# Patient Record
Sex: FEMALE | Race: ASIAN | NOT HISPANIC OR LATINO | URBAN - METROPOLITAN AREA
[De-identification: names, ages, dates, MRNs, and addresses within clinical notes are randomized per-mention and may not be internally consistent; named-entity substitution may affect disease eponyms.]

---

## 2024-09-23 ENCOUNTER — INPATIENT (INPATIENT)
Facility: HOSPITAL | Age: 64
LOS: 3 days | Discharge: HOME | End: 2024-09-27
Attending: SURGERY | Admitting: SURGERY
Payer: MEDICAID

## 2024-09-23 VITALS
DIASTOLIC BLOOD PRESSURE: 85 MMHG | HEART RATE: 86 BPM | OXYGEN SATURATION: 96 % | WEIGHT: 147.05 LBS | TEMPERATURE: 99 F | SYSTOLIC BLOOD PRESSURE: 132 MMHG | RESPIRATION RATE: 18 BRPM

## 2024-09-23 LAB
ADD ON TEST-SPECIMEN IN LAB: SIGNIFICANT CHANGE UP
ALBUMIN SERPL ELPH-MCNC: 3.9 G/DL — SIGNIFICANT CHANGE UP (ref 3.3–5)
ALBUMIN SERPL ELPH-MCNC: 4.4 G/DL — SIGNIFICANT CHANGE UP (ref 3.3–5)
ALP SERPL-CCNC: 114 U/L — SIGNIFICANT CHANGE UP (ref 40–120)
ALP SERPL-CCNC: 125 U/L — HIGH (ref 40–120)
ALT FLD-CCNC: 58 U/L — HIGH (ref 10–45)
ALT FLD-CCNC: 64 U/L — HIGH (ref 10–45)
ANION GAP SERPL CALC-SCNC: 11 MMOL/L — SIGNIFICANT CHANGE UP (ref 5–17)
ANION GAP SERPL CALC-SCNC: 9 MMOL/L — SIGNIFICANT CHANGE UP (ref 5–17)
AST SERPL-CCNC: 43 U/L — HIGH (ref 10–40)
AST SERPL-CCNC: 54 U/L — HIGH (ref 10–40)
BASOPHILS # BLD AUTO: 0.05 K/UL — SIGNIFICANT CHANGE UP (ref 0–0.2)
BASOPHILS NFR BLD AUTO: 0.7 % — SIGNIFICANT CHANGE UP (ref 0–2)
BILIRUB SERPL-MCNC: 0.6 MG/DL — SIGNIFICANT CHANGE UP (ref 0.2–1.2)
BILIRUB SERPL-MCNC: 0.7 MG/DL — SIGNIFICANT CHANGE UP (ref 0.2–1.2)
BUN SERPL-MCNC: 13 MG/DL — SIGNIFICANT CHANGE UP (ref 7–23)
BUN SERPL-MCNC: 14 MG/DL — SIGNIFICANT CHANGE UP (ref 7–23)
CALCIUM SERPL-MCNC: 9.2 MG/DL — SIGNIFICANT CHANGE UP (ref 8.4–10.5)
CALCIUM SERPL-MCNC: 9.6 MG/DL — SIGNIFICANT CHANGE UP (ref 8.4–10.5)
CHLORIDE SERPL-SCNC: 105 MMOL/L — SIGNIFICANT CHANGE UP (ref 96–108)
CHLORIDE SERPL-SCNC: 106 MMOL/L — SIGNIFICANT CHANGE UP (ref 96–108)
CO2 SERPL-SCNC: 25 MMOL/L — SIGNIFICANT CHANGE UP (ref 22–31)
CO2 SERPL-SCNC: 25 MMOL/L — SIGNIFICANT CHANGE UP (ref 22–31)
CREAT SERPL-MCNC: 0.8 MG/DL — SIGNIFICANT CHANGE UP (ref 0.5–1.3)
CREAT SERPL-MCNC: 0.9 MG/DL — SIGNIFICANT CHANGE UP (ref 0.5–1.3)
EGFR: 73 ML/MIN/1.73M2 — SIGNIFICANT CHANGE UP
EGFR: 84 ML/MIN/1.73M2 — SIGNIFICANT CHANGE UP
EOSINOPHIL # BLD AUTO: 0.34 K/UL — SIGNIFICANT CHANGE UP (ref 0–0.5)
EOSINOPHIL NFR BLD AUTO: 4.9 % — SIGNIFICANT CHANGE UP (ref 0–6)
GLUCOSE SERPL-MCNC: 102 MG/DL — HIGH (ref 70–99)
GLUCOSE SERPL-MCNC: 103 MG/DL — HIGH (ref 70–99)
HCT VFR BLD CALC: 36.1 % — SIGNIFICANT CHANGE UP (ref 34.5–45)
HGB BLD-MCNC: 11.3 G/DL — LOW (ref 11.5–15.5)
IMM GRANULOCYTES NFR BLD AUTO: 0.4 % — SIGNIFICANT CHANGE UP (ref 0–0.9)
LYMPHOCYTES # BLD AUTO: 2.64 K/UL — SIGNIFICANT CHANGE UP (ref 1–3.3)
LYMPHOCYTES # BLD AUTO: 37.9 % — SIGNIFICANT CHANGE UP (ref 13–44)
MCHC RBC-ENTMCNC: 28 PG — SIGNIFICANT CHANGE UP (ref 27–34)
MCHC RBC-ENTMCNC: 31.3 GM/DL — LOW (ref 32–36)
MCV RBC AUTO: 89.4 FL — SIGNIFICANT CHANGE UP (ref 80–100)
MONOCYTES # BLD AUTO: 0.4 K/UL — SIGNIFICANT CHANGE UP (ref 0–0.9)
MONOCYTES NFR BLD AUTO: 5.7 % — SIGNIFICANT CHANGE UP (ref 2–14)
NEUTROPHILS # BLD AUTO: 3.5 K/UL — SIGNIFICANT CHANGE UP (ref 1.8–7.4)
NEUTROPHILS NFR BLD AUTO: 50.4 % — SIGNIFICANT CHANGE UP (ref 43–77)
NRBC # BLD: 0 /100 WBCS — SIGNIFICANT CHANGE UP (ref 0–0)
PLATELET # BLD AUTO: 407 K/UL — HIGH (ref 150–400)
POTASSIUM SERPL-MCNC: 4.8 MMOL/L — SIGNIFICANT CHANGE UP (ref 3.5–5.3)
POTASSIUM SERPL-MCNC: SIGNIFICANT CHANGE UP MMOL/L (ref 3.5–5.3)
POTASSIUM SERPL-SCNC: 4.8 MMOL/L — SIGNIFICANT CHANGE UP (ref 3.5–5.3)
POTASSIUM SERPL-SCNC: SIGNIFICANT CHANGE UP MMOL/L (ref 3.5–5.3)
PROT SERPL-MCNC: 7.7 G/DL — SIGNIFICANT CHANGE UP (ref 6–8.3)
PROT SERPL-MCNC: 8.6 G/DL — HIGH (ref 6–8.3)
RBC # BLD: 4.04 M/UL — SIGNIFICANT CHANGE UP (ref 3.8–5.2)
RBC # FLD: 14.7 % — HIGH (ref 10.3–14.5)
SODIUM SERPL-SCNC: 140 MMOL/L — SIGNIFICANT CHANGE UP (ref 135–145)
SODIUM SERPL-SCNC: 141 MMOL/L — SIGNIFICANT CHANGE UP (ref 135–145)
WBC # BLD: 6.96 K/UL — SIGNIFICANT CHANGE UP (ref 3.8–10.5)
WBC # FLD AUTO: 6.96 K/UL — SIGNIFICANT CHANGE UP (ref 3.8–10.5)

## 2024-09-23 PROCEDURE — 86077 PHYS BLOOD BANK SERV XMATCH: CPT

## 2024-09-23 PROCEDURE — 99285 EMERGENCY DEPT VISIT HI MDM: CPT

## 2024-09-23 PROCEDURE — 76705 ECHO EXAM OF ABDOMEN: CPT | Mod: 26

## 2024-09-23 RX ORDER — HYDROMORPHONE HYDROCHLORIDE 1 MG/ML
0.5 INJECTION, SOLUTION INTRAMUSCULAR; INTRAVENOUS; SUBCUTANEOUS EVERY 4 HOURS
Refills: 0 | Status: DISCONTINUED | OUTPATIENT
Start: 2024-09-23 | End: 2024-09-26

## 2024-09-23 RX ORDER — SODIUM CHLORIDE IRRIG SOLUTION 0.9 %
1000 SOLUTION, IRRIGATION IRRIGATION
Refills: 0 | Status: DISCONTINUED | OUTPATIENT
Start: 2024-09-23 | End: 2024-09-26

## 2024-09-23 RX ORDER — FENOFIBRATE NANOCRYSTALLIZED 145 MG
1 TABLET ORAL
Refills: 0 | DISCHARGE

## 2024-09-23 RX ORDER — KETOROLAC TROMETHAMINE 10 MG/1
15 TABLET, FILM COATED ORAL ONCE
Refills: 0 | Status: DISCONTINUED | OUTPATIENT
Start: 2024-09-23 | End: 2024-09-23

## 2024-09-23 RX ORDER — PANTOPRAZOLE SODIUM 40 MG/1
40 TABLET, DELAYED RELEASE ORAL DAILY
Refills: 0 | Status: DISCONTINUED | OUTPATIENT
Start: 2024-09-23 | End: 2024-09-26

## 2024-09-23 RX ORDER — ONDANSETRON HCL/PF 4 MG/2 ML
4 VIAL (ML) INJECTION ONCE
Refills: 0 | Status: COMPLETED | OUTPATIENT
Start: 2024-09-23 | End: 2024-09-23

## 2024-09-23 RX ORDER — SODIUM CHLORIDE IRRIG SOLUTION 0.9 %
1000 SOLUTION, IRRIGATION IRRIGATION ONCE
Refills: 0 | Status: COMPLETED | OUTPATIENT
Start: 2024-09-23 | End: 2024-09-23

## 2024-09-23 RX ORDER — ACETAMINOPHEN 325 MG
1000 TABLET ORAL ONCE
Refills: 0 | Status: DISCONTINUED | OUTPATIENT
Start: 2024-09-23 | End: 2024-09-26

## 2024-09-23 RX ORDER — AMLODIPINE BESYLATE 5 MG
1 TABLET ORAL
Refills: 0 | DISCHARGE

## 2024-09-23 RX ORDER — ONDANSETRON HCL/PF 4 MG/2 ML
4 VIAL (ML) INJECTION EVERY 6 HOURS
Refills: 0 | Status: DISCONTINUED | OUTPATIENT
Start: 2024-09-23 | End: 2024-09-26

## 2024-09-23 RX ORDER — ENALAPRIL MALEATE 5 MG
1 TABLET ORAL
Refills: 0 | DISCHARGE

## 2024-09-23 RX ADMIN — Medication 1000 MILLILITER(S): at 19:19

## 2024-09-23 RX ADMIN — Medication 4 MILLIGRAM(S): at 21:34

## 2024-09-23 RX ADMIN — KETOROLAC TROMETHAMINE 15 MILLIGRAM(S): 10 TABLET, FILM COATED ORAL at 21:35

## 2024-09-23 RX ADMIN — Medication 1000 MILLILITER(S): at 21:30

## 2024-09-23 NOTE — ED PROVIDER NOTE - OBJECTIVE STATEMENT
63-year-old female with HTN, HLD, hypothyroidism, prior stomach ulcers on omeprazole, recent hospitalization abroad end of July for cholecystitis treated with antibiotics (no cholecystectomy per daughter because patient's gallbladder was too inflamed at the time).  Patient comes in today for persistent right upper quadrant pain radiating to the back associated with nausea, says pain is always there but is worsened at the end of eating.  Has been eating less due to the discomfort.  No fevers, chills.  Normal bowel movements.  Saw her PCP today who referred her to come to the ED to be evaluated by Dr. Escudero  Past surgical hx- C section

## 2024-09-23 NOTE — ED ADULT NURSE REASSESSMENT NOTE - NS ED NURSE REASSESS COMMENT FT1
Received patient report from JOSEFINA Torres. Patient currently in ultrasound upon RN handoff. Vital signs as noted in flowsheet. Patient came in due to abdominal pain with PMH of gallstones. Patient oriented to ED area. Plan of care discussed and verbalized understanding. All needs attended. Purposeful proactive hourly rounding in progress.

## 2024-09-23 NOTE — ED PROVIDER NOTE - CLINICAL SUMMARY MEDICAL DECISION MAKING FREE TEXT BOX
Normal VS, comfortable appearing not in acute distress  known gallstones and recent episode of cholecystitis treated w/ abx, here w/ RUQ pain and nausea  ddx- r/o recurrent cholecystitis vs symptomatic cholelithiasis vs choledocholithiasis vs pancreatitis    plan for labs, RUQ US, symptomatic treatment, surgery consult

## 2024-09-23 NOTE — ED PROVIDER NOTE - PHYSICAL EXAMINATION
CONST: nontoxic NAD speaking in full sentences  HEAD: atraumatic  EYES: conjunctivae clear  NECK: supple  CARD: regular rate  CHEST: breathing comfortably, no stridor/retractions/tripoding  ABD: soft nondistended negative murphys sign  EXT: FROM  SKIN: warm, dry  NEURO: awake alert answering questions following commands moving all extremities

## 2024-09-23 NOTE — ED ADULT NURSE NOTE - CHIEF COMPLAINT QUOTE
pt presents to ER with daughter, daughter states that she had RUQ abdominal pain while in [place] in July, had an ultrasound that showed gallstones. as per daughter, gallstones were so inflamed that they couldn't operate so she was put on antibiotics. pt states the pain went away for a few weeks and has returned a few days ago. denies n/v/d or fevers.

## 2024-09-23 NOTE — ED ADULT NURSE NOTE - OBJECTIVE STATEMENT
Pt is a 64 y/o female A&Ox4 in NAD ambulatory with steady gait c/o RUQ abdominal pain. Pt diagnosed with gallstones in July and treated with antibiotics. Pt denies N/V/D, fever/chills. RR even and unlabored.

## 2024-09-23 NOTE — H&P ADULT - ASSESSMENT
63F PMHx acute cholecystitis (tx w/ abx), gastric ulcers admitted for likely biliary colic vs possible early acute cholecystitis. Pt w/ prior hx of acute cholecystitis episode x2 months ago, tx w/ abx. Pt currently afebrile, CBC WNL, no RUQ TTP on PE, negative murphys, US demonstrates cholelithiasis w/o pericholecystic fluid, no wall thickening. Plan to admit to regional for cholecystectomy this week.    Admit - [facility] - [facility]  NPO/IVF  Pain/Nausea PRN  OOBA/IS/SCD/SQH  AM/Pre-op labs  Home meds as appropriate    Home

## 2024-09-23 NOTE — H&P ADULT - HISTORY OF PRESENT ILLNESS
64yo nasreen speaking Female pt with PMH HTN, HLD, gastric ulcers presents reporting intermittent, sharp, 6/10 RUQ pain w/ radiation to RT side/back w/ some associated nausea, episodes last 4-6 hours, unrelated to PO intake (reports some resolution w/ eating). Pt reported to PCP today who referred to ED for evaluation by Dr. Escudero. Pt reports prior hospitalization for acute cholecystitis x2 months ago while abroad, tx w/ abx.    In the ED, pt afebrile, nontachycardic, normotensive, and satting on RA. Labs demonstrate WBC 4, Hgb 11, AST/ALT 43/58, lipase 45, tbili 0.6. RUQ US demonstrates cholelithiasis w/o pericholecystic fluid, no wall thickening, RT lobe liver lesion, likely hemangioma.     PMH: HTN, HLD, hypothyroidism, gastric ulcers, cholecystitis   PSHx:   Medications: Omeprazole, Fenofibrate 160 qd, Enalapril 5 mg qd, Amlodipine 10 mg qd  Allergies: Shellfish  Social Hx: Denies tobacco, alcohol, rec drug use  Last colonoscopy: NA  Last EGD: 3-4 months ago, found to have gastric ulcers     T(C): 36.9 (24 @ 21:28), Max: 37 (24 @ 18:25)  HR: 67 (24 @ 21:28) (67 - 86)  BP: 115/77 (24 @ 21:28) (115/77 - 132/85)  RR: 19 (24 @ 21:28) (18 - 19)  SpO2: 95% (24 @ 21:28) (95% - 96%)        LABS:                        11.3   6.96  )-----------( 407      ( 23 Sep 2024 19:04 )             36.1     -    140  |  106  |  14  ----------------------------<  103[H]  4.8   |  25  |  0.80    Ca    9.2      23 Sep 2024 21:13    TPro  7.7  /  Alb  3.9  /  TBili  0.6  /  DBili  x   /  AST  43[H]  /  ALT  58[H]  /  AlkPhos  114  09

## 2024-09-23 NOTE — H&P ADULT - NSHPPHYSICALEXAM_GEN_ALL_CORE
Physical Exam  General: AAOx3, NAD, laying comfortably in bed  Cardio: S1,S2, No MRG  Pulm: Nonlabored breathing  Abdomen: Soft, NT, negative Jimenez's, nondistended, no rebound/guarding   Extremities: WWP, peripheral pulses appreciated

## 2024-09-24 LAB
ADD ON TEST-SPECIMEN IN LAB: SIGNIFICANT CHANGE UP
ALBUMIN SERPL ELPH-MCNC: 4 G/DL — SIGNIFICANT CHANGE UP (ref 3.3–5)
ALP SERPL-CCNC: 107 U/L — SIGNIFICANT CHANGE UP (ref 40–120)
ALT FLD-CCNC: 55 U/L — HIGH (ref 10–45)
ANION GAP SERPL CALC-SCNC: 9 MMOL/L — SIGNIFICANT CHANGE UP (ref 5–17)
APTT BLD: 44.8 SEC — HIGH (ref 24.5–35.6)
AST SERPL-CCNC: 41 U/L — HIGH (ref 10–40)
BILIRUB DIRECT SERPL-MCNC: 0.3 MG/DL — SIGNIFICANT CHANGE UP (ref 0–0.3)
BILIRUB INDIRECT FLD-MCNC: 0.3 MG/DL — SIGNIFICANT CHANGE UP (ref 0.2–1)
BILIRUB SERPL-MCNC: 0.6 MG/DL — SIGNIFICANT CHANGE UP (ref 0.2–1.2)
BLD GP AB SCN SERPL QL: POSITIVE — SIGNIFICANT CHANGE UP
BLD GP AB SCN SERPL QL: POSITIVE — SIGNIFICANT CHANGE UP
BUN SERPL-MCNC: 13 MG/DL — SIGNIFICANT CHANGE UP (ref 7–23)
CALCIUM SERPL-MCNC: 9.4 MG/DL — SIGNIFICANT CHANGE UP (ref 8.4–10.5)
CHLORIDE SERPL-SCNC: 105 MMOL/L — SIGNIFICANT CHANGE UP (ref 96–108)
CO2 SERPL-SCNC: 28 MMOL/L — SIGNIFICANT CHANGE UP (ref 22–31)
CREAT SERPL-MCNC: 0.93 MG/DL — SIGNIFICANT CHANGE UP (ref 0.5–1.3)
EGFR: 70 ML/MIN/1.73M2 — SIGNIFICANT CHANGE UP
GLUCOSE SERPL-MCNC: 97 MG/DL — SIGNIFICANT CHANGE UP (ref 70–99)
HCT VFR BLD CALC: 34.3 % — LOW (ref 34.5–45)
HGB BLD-MCNC: 10.9 G/DL — LOW (ref 11.5–15.5)
INR BLD: 0.96 — SIGNIFICANT CHANGE UP (ref 0.85–1.18)
MAGNESIUM SERPL-MCNC: 2.1 MG/DL — SIGNIFICANT CHANGE UP (ref 1.6–2.6)
MCHC RBC-ENTMCNC: 28.8 PG — SIGNIFICANT CHANGE UP (ref 27–34)
MCHC RBC-ENTMCNC: 31.8 GM/DL — LOW (ref 32–36)
MCV RBC AUTO: 90.5 FL — SIGNIFICANT CHANGE UP (ref 80–100)
NRBC # BLD: 0 /100 WBCS — SIGNIFICANT CHANGE UP (ref 0–0)
PHOSPHATE SERPL-MCNC: 4.1 MG/DL — SIGNIFICANT CHANGE UP (ref 2.5–4.5)
PLATELET # BLD AUTO: 368 K/UL — SIGNIFICANT CHANGE UP (ref 150–400)
POTASSIUM SERPL-MCNC: 4.4 MMOL/L — SIGNIFICANT CHANGE UP (ref 3.5–5.3)
POTASSIUM SERPL-SCNC: 4.4 MMOL/L — SIGNIFICANT CHANGE UP (ref 3.5–5.3)
PROT SERPL-MCNC: 7.5 G/DL — SIGNIFICANT CHANGE UP (ref 6–8.3)
PROTHROM AB SERPL-ACNC: 11 SEC — SIGNIFICANT CHANGE UP (ref 9.5–13)
RBC # BLD: 3.79 M/UL — LOW (ref 3.8–5.2)
RBC # FLD: 14.7 % — HIGH (ref 10.3–14.5)
RH IG SCN BLD-IMP: POSITIVE — SIGNIFICANT CHANGE UP
SODIUM SERPL-SCNC: 142 MMOL/L — SIGNIFICANT CHANGE UP (ref 135–145)
WBC # BLD: 6.26 K/UL — SIGNIFICANT CHANGE UP (ref 3.8–10.5)
WBC # FLD AUTO: 6.26 K/UL — SIGNIFICANT CHANGE UP (ref 3.8–10.5)

## 2024-09-24 PROCEDURE — 99223 1ST HOSP IP/OBS HIGH 75: CPT

## 2024-09-24 RX ADMIN — Medication 5000 UNIT(S): at 19:13

## 2024-09-24 RX ADMIN — Medication 100 MILLILITER(S): at 01:24

## 2024-09-24 RX ADMIN — Medication 5000 UNIT(S): at 05:22

## 2024-09-24 RX ADMIN — PANTOPRAZOLE SODIUM 40 MILLIGRAM(S): 40 TABLET, DELAYED RELEASE ORAL at 12:57

## 2024-09-24 NOTE — PATIENT PROFILE ADULT - MST SCORE
----- Message from MINA Griffin sent at 5/13/2021  8:28 AM CDT -----  Please notify patient that urine culture did grow some bacteria-how she feeling? If symptomatic-would send prescription for Macrobid- please ask which pharmacy.
Patient informed of the below results and recommendations. Patient states she does continue to have symptoms; burning at the end of urinating but not as bad. Patient would like prescription faxed to [facility], [facility]. Please advise.
0

## 2024-09-24 NOTE — PROGRESS NOTE ADULT - SUBJECTIVE AND OBJECTIVE BOX
SUBJECTIVE:   Pt seen and examined by chief resident. Pt is doing well, resting comfortably on bed. Feeling better. Pain controlled. Passing gas. No nausea or vomiting. No complaints at this time.    Vital Signs Last 24 Hrs  T(C): 36.7 (24 Sep 2024 05:22), Max: 37 (23 Sep 2024 18:25)  T(F): 98 (24 Sep 2024 05:22), Max: 98.6 (23 Sep 2024 18:25)  HR: 59 (24 Sep 2024 05:22) (56 - 86)  BP: 131/76 (24 Sep 2024 05:22) (115/77 - 134/78)  BP(mean): --  RR: 16 (24 Sep 2024 05:22) (16 - 19)  SpO2: 96% (24 Sep 2024 05:22) (95% - 100%)    Parameters below as of 23 Sep 2024 23:20  Patient On (Oxygen Delivery Method): room air        I&O's Summary    23 Sep 2024 07:01  -  24 Sep 2024 07:00  --------------------------------------------------------  IN: 0 mL / OUT: 425 mL / NET: -425 mL        Physical Exam:  General Appearance: Appears well, NAD  Pulmonary: Nonlabored breathing, no respiratory distress  Abdomen: Soft, nondisteded, nontender  Extremities: WWP, SCD's in place     LABS:                        10.9   6.26  )-----------( 368      ( 24 Sep 2024 05:30 )             34.3     09-24    142  |  105  |  13  ----------------------------<  97  4.4   |  28  |  0.93    Ca    9.4      24 Sep 2024 05:30  Phos  4.1     09-24  Mg     2.1     09-24    TPro  7.7  /  Alb  3.9  /  TBili  0.6  /  DBili  x   /  AST  43[H]  /  ALT  58[H]  /  AlkPhos  114  09-23    PT/INR - ( 24 Sep 2024 05:30 )   PT: 11.0 sec;   INR: 0.96          PTT - ( 24 Sep 2024 05:30 )  PTT:44.8 sec  Urinalysis Basic - ( 24 Sep 2024 05:30 )    Color: x / Appearance: x / SG: x / pH: x  Gluc: 97 mg/dL / Ketone: x  / Bili: x / Urobili: x   Blood: x / Protein: x / Nitrite: x   Leuk Esterase: x / RBC: x / WBC x   Sq Epi: x / Non Sq Epi: x / Bacteria: x

## 2024-09-24 NOTE — PATIENT PROFILE ADULT - FALL HARM RISK - UNIVERSAL INTERVENTIONS
Bed in lowest position, wheels locked, appropriate side rails in place/Call bell, personal items and telephone in reach/Instruct patient to call for assistance before getting out of bed or chair/Non-slip footwear when patient is out of bed/[place] to call system/Physically safe environment - no spills, clutter or unnecessary equipment/Purposeful Proactive Rounding/Room/bathroom lighting operational, light cord in reach

## 2024-09-24 NOTE — CONSULT NOTE ADULT - SUBJECTIVE AND OBJECTIVE BOX
HPI:  64yo nasreen speaking Female pt with PMH HTN, HLD, gastric ulcers presents reporting intermittent, sharp, 6/10 RUQ pain w/ radiation to RT side/back w/ some associated nausea, episodes last 4-6 hours, unrelated to PO intake (reports some resolution w/ eating). Pt reported to PCP today who referred to ED for evaluation by Dr. Escudero. Pt reports prior hospitalization for acute cholecystitis x2 months ago while abroad, tx w/ abx.    In the ED, pt afebrile, nontachycardic, normotensive, and satting on RA. Labs demonstrate WBC 4, Hgb 11, AST/ALT 43/58, lipase 45, tbili 0.6. RUQ US demonstrates cholelithiasis w/o pericholecystic fluid, no wall thickening, RT lobe liver lesion, likely hemangioma.     PMH: HTN, HLD, hypothyroidism, gastric ulcers, cholecystitis   PSHx:   Medications: Omeprazole, Fenofibrate 160 qd, Enalapril 5 mg qd, Amlodipine 10 mg qd  Allergies: Shellfish  Social Hx: Denies tobacco, alcohol, rec drug use  Last colonoscopy: NA  Last EGD: 3-4 months ago, found to have gastric ulcers     T(C): 36.9 (24 @ 21:28), Max: 37 (24 @ 18:25)  HR: 67 (24 @ 21:28) (67 - 86)  BP: 115/77 (24 @ 21:28) (115/77 - 132/85)  RR: 19 (24 @ 21:28) (18 - 19)  SpO2: 95% (24 @ 21:28) (95% - 96%)        LABS:                        11.3   6.96  )-----------( 407      ( 23 Sep 2024 19:04 )             36.1         140  |  106  |  14  ----------------------------<  103[H]  4.8   |  25  |  0.80    Ca    9.2      23 Sep 2024 21:13    TPro  7.7  /  Alb  3.9  /  TBili  0.6  /  DBili  x   /  AST  43[H]  /  ALT  58[H]  /  AlkPhos  114  09-   (23 Sep 2024 23:30)      PAST MEDICAL & SURGICAL HISTORY:      Home Medications:  amLODIPine 10 mg oral tablet: 1 tab(s) orally once a day (23 Sep 2024 23:39)  enalapril 5 mg oral tablet: 1 tab(s) orally once a day (23 Sep 2024 23:39)  fenofibrate 160 mg oral tablet: 1 tab(s) orally once a day (23 Sep 2024 23:39)  omeprazole 40 mg oral delayed release capsule: 1 cap(s) orally once a day (23 Sep 2024 23:39)      Allergies    No Known Drug Allergies  shellfish (Swelling)    Intolerances        FAMILY HISTORY:    CURRENT MEDICATIONS:   acetaminophen   IVPB .. 1000 milliGRAM(s) IV Intermittent once PRN  HYDROmorphone  Injectable 0.5 milliGRAM(s) IV Push every 4 hours PRN  lactated ringers. 1000 milliLiter(s) IV Continuous <Continuous>  ondansetron Injectable 4 milliGRAM(s) IV Push every 6 hours PRN  pantoprazole  Injectable 40 milliGRAM(s) IV Push daily      VITAL SIGNS, INS/OUTS (last 24 hours):  Vital Signs Last 24 Hrs  T(C): 36.8 (24 Sep 2024 13:37), Max: 37 (23 Sep 2024 18:25)  T(F): 98.2 (24 Sep 2024 13:37), Max: 98.6 (23 Sep 2024 18:25)  HR: 65 (24 Sep 2024 13:37) (53 - 86)  BP: 125/76 (24 Sep 2024 13:37) (115/77 - 134/78)  BP(mean): --  RR: 18 (24 Sep 2024 13:37) (16 - 19)  SpO2: 94% (24 Sep 2024 13:37) (94% - 100%)    Parameters below as of 24 Sep 2024 13:37  Patient On (Oxygen Delivery Method): room air      I&O's Summary    23 Sep 2024 07:01  -  24 Sep 2024 07:00  --------------------------------------------------------  IN: 0 mL / OUT: 425 mL / NET: -425 mL    24 Sep 2024 07:01  -  24 Sep 2024 13:48  --------------------------------------------------------  IN: 0 mL / OUT: 400 mL / NET: -400 mL        PHYSICAL EXAM:  Gen: Reclining in bed at time of exam, appears stated age  HEENT: NCAT, MMM, clear OP  Neck: supple, trachea at midline  CV: RRR, +S1/S2  Pulm: adequate respiratory effort, no increase in work of breathing  Abd: soft, NTND  Skin: warm and dry, no new rashes vs prior report  Ext: WWP, no LE edema  Neuro: AOx3, no gross focal neurological deficits  Psych: affect and behavior appropriate, pleasant at time of interview    BASIC LABS:                        10.9   6.26  )-----------( 368      ( 24 Sep 2024 05:30 )             34.3         142  |  105  |  13  ----------------------------<  97  4.4   |  28  |  0.93    Ca    9.4      24 Sep 2024 05:30  Phos  4.1       Mg     2.1         TPro  7.5  /  Alb  4.0  /  TBili  0.6  /  DBili  0.3  /  AST  41[H]  /  ALT  55[H]  /  AlkPhos  107  24    PT/INR - ( 24 Sep 2024 05:30 )   PT: 11.0 sec;   INR: 0.96          PTT - ( 24 Sep 2024 05:30 )  PTT:44.8 sec  Urinalysis Basic - ( 24 Sep 2024 05:30 )    Color: x / Appearance: x / SG: x / pH: x  Gluc: 97 mg/dL / Ketone: x  / Bili: x / Urobili: x   Blood: x / Protein: x / Nitrite: x   Leuk Esterase: x / RBC: x / WBC x   Sq Epi: x / Non Sq Epi: x / Bacteria: x      CAPILLARY BLOOD GLUCOSE          OTHER LABS:        MICRODATA:      IMAGING:    EKG:    #Diet - Diet, NPO (24 @ 01:56) [Active]        #DVT PPx -  #Dispo -

## 2024-09-24 NOTE — CONSULT NOTE ADULT - ASSESSMENT
63F PMHx acute cholecystitis (tx w/ abx), gastric ulcers, HTN, HLD admitted for likely biliary colic vs possible early acute cholecystitis. Admitted for cholecystitis     #Cholecystitis   Plan for OR today   NPO on IVF   Pain management with tylenol and dilaudid   Management as per surgery team     #Hx of gastric ulcer   On omeprazole   Continue pantoprazole while admitted     #HTN   #HLD  On Fenofibrate 160 qd, Enalapril 5 mg qd, Amlodipine 10 mg qd at home   Resume home meds when able to take PO (with hold parameters)    SCD boot for DVT ppx

## 2024-09-25 LAB
ALBUMIN SERPL ELPH-MCNC: 4.1 G/DL — SIGNIFICANT CHANGE UP (ref 3.3–5)
ALP SERPL-CCNC: 112 U/L — SIGNIFICANT CHANGE UP (ref 40–120)
ALT FLD-CCNC: 54 U/L — HIGH (ref 10–45)
ANION GAP SERPL CALC-SCNC: 10 MMOL/L — SIGNIFICANT CHANGE UP (ref 5–17)
AST SERPL-CCNC: 43 U/L — HIGH (ref 10–40)
BILIRUB SERPL-MCNC: 0.7 MG/DL — SIGNIFICANT CHANGE UP (ref 0.2–1.2)
BUN SERPL-MCNC: 11 MG/DL — SIGNIFICANT CHANGE UP (ref 7–23)
CALCIUM SERPL-MCNC: 9.5 MG/DL — SIGNIFICANT CHANGE UP (ref 8.4–10.5)
CHLORIDE SERPL-SCNC: 103 MMOL/L — SIGNIFICANT CHANGE UP (ref 96–108)
CO2 SERPL-SCNC: 26 MMOL/L — SIGNIFICANT CHANGE UP (ref 22–31)
CREAT SERPL-MCNC: 0.85 MG/DL — SIGNIFICANT CHANGE UP (ref 0.5–1.3)
EGFR: 78 ML/MIN/1.73M2 — SIGNIFICANT CHANGE UP
GLUCOSE SERPL-MCNC: 90 MG/DL — SIGNIFICANT CHANGE UP (ref 70–99)
HCT VFR BLD CALC: 33.8 % — LOW (ref 34.5–45)
HGB BLD-MCNC: 10.9 G/DL — LOW (ref 11.5–15.5)
MAGNESIUM SERPL-MCNC: 2.1 MG/DL — SIGNIFICANT CHANGE UP (ref 1.6–2.6)
MCHC RBC-ENTMCNC: 28.2 PG — SIGNIFICANT CHANGE UP (ref 27–34)
MCHC RBC-ENTMCNC: 32.2 GM/DL — SIGNIFICANT CHANGE UP (ref 32–36)
MCV RBC AUTO: 87.6 FL — SIGNIFICANT CHANGE UP (ref 80–100)
NRBC # BLD: 0 /100 WBCS — SIGNIFICANT CHANGE UP (ref 0–0)
PHOSPHATE SERPL-MCNC: 3.8 MG/DL — SIGNIFICANT CHANGE UP (ref 2.5–4.5)
PLATELET # BLD AUTO: 381 K/UL — SIGNIFICANT CHANGE UP (ref 150–400)
POTASSIUM SERPL-MCNC: 4 MMOL/L — SIGNIFICANT CHANGE UP (ref 3.5–5.3)
POTASSIUM SERPL-SCNC: 4 MMOL/L — SIGNIFICANT CHANGE UP (ref 3.5–5.3)
PROT SERPL-MCNC: 8 G/DL — SIGNIFICANT CHANGE UP (ref 6–8.3)
RBC # BLD: 3.86 M/UL — SIGNIFICANT CHANGE UP (ref 3.8–5.2)
RBC # FLD: 14 % — SIGNIFICANT CHANGE UP (ref 10.3–14.5)
SODIUM SERPL-SCNC: 139 MMOL/L — SIGNIFICANT CHANGE UP (ref 135–145)
WBC # BLD: 5.44 K/UL — SIGNIFICANT CHANGE UP (ref 3.8–10.5)
WBC # FLD AUTO: 5.44 K/UL — SIGNIFICANT CHANGE UP (ref 3.8–10.5)

## 2024-09-25 PROCEDURE — 99231 SBSQ HOSP IP/OBS SF/LOW 25: CPT

## 2024-09-25 PROCEDURE — 99232 SBSQ HOSP IP/OBS MODERATE 35: CPT

## 2024-09-25 RX ADMIN — Medication 5000 UNIT(S): at 04:39

## 2024-09-25 RX ADMIN — Medication 100 MILLILITER(S): at 08:36

## 2024-09-25 RX ADMIN — Medication 5000 UNIT(S): at 19:37

## 2024-09-25 RX ADMIN — PANTOPRAZOLE SODIUM 40 MILLIGRAM(S): 40 TABLET, DELAYED RELEASE ORAL at 12:24

## 2024-09-25 RX ADMIN — Medication 100 MILLILITER(S): at 17:50

## 2024-09-25 NOTE — DISCHARGE NOTE PROVIDER - CARE PROVIDER_API CALL
Armando Escudero [facility]  Surgery  [address] [place] [id number]  Phone: [phone]  Fax: [phone]  Follow Up Time:

## 2024-09-25 NOTE — DISCHARGE NOTE PROVIDER - NSDCFUADDINST_GEN_ALL_CORE_FT
General Discharge Instructions:  Please resume all regular home medications unless specifically advised not to take a particular medication. Also, please take any new medications as prescribed.  Please get plenty of rest, continue to ambulate several times per day, and drink adequate amounts of fluids. Avoid lifting weights greater than 5-10 lbs until you follow-up with your surgeon, who will instruct you further regarding activity restrictions.  Avoid driving or operating heavy machinery while taking pain medications.  Please follow-up with your surgeon and Primary Care Provider (PCP) as advised.  Please follow up with Dr. Escudero in 1-2 weeks by calling his office at [phone]    Medications:   -Please take over the counter Tylenol (650mg-1000mg) every 6 hours for mild-moderate pain control. Do not exceed 4000mg of Tylenol daily.   -Please take Oxycodone 5mg every 6-8 hours as per needed for severe pain.  -Please take Colace to prevent constipation from Oxycodone. Please take if you are taking Oxycodone.      Incision Care:  *Please call your doctor or nurse practitioner if you have increased pain, swelling, redness, or drainage from the incision site.  *Avoid swimming and baths until your follow-up appointment.  *You may shower, and wash surgical incisions with a mild soap and warm water. Gently pat the area dry.    Warning Signs:  Please call your doctor or nurse practitioner if you experience the following:  *You experience new chest pain, pressure, squeezing or tightness.  *New or worsening cough, shortness of breath, or wheeze.  *If you are vomiting and cannot keep down fluids or your medications.  *You are getting dehydrated due to continued vomiting, diarrhea, or other reasons. Signs of dehydration include dry mouth, rapid heartbeat, or feeling dizzy or faint when standing.  *You see blood or dark/black material when you vomit or have a bowel movement.  *You experience burning when you urinate, have blood in your urine, or experience a discharge.  *Your pain is not improving within 8-12 hours or is not gone within 24 hours. Call or return immediately if your pain is getting worse, changes location, or moves to your chest or back.  *You have shaking chills, or fever greater than 101.5 degrees Fahrenheit or 38 degrees Celsius.  *Any change in your symptoms, or any new symptoms that concern you.  General Discharge Instructions:  Please resume all regular home medications unless specifically advised not to take a particular medication. Also, please take any new medications as prescribed.  Please get plenty of rest, continue to ambulate several times per day, and drink adequate amounts of fluids. Avoid lifting weights greater than 5-10 lbs until you follow-up with your surgeon, who will instruct you further regarding activity restrictions.  Avoid driving or operating heavy machinery while taking pain medications.  Please follow-up with your surgeon and Primary Care Provider (PCP) as advised.  Please follow up with Dr. Kena leach by calling his office at [phone]    Medications:   -Please take over the counter Tylenol (650mg-1000mg) every 6 hours for mild-moderate pain control. Do not exceed 4000mg of Tylenol daily.   -Please take Oxycodone 5mg every 6-8 hours as per needed for severe pain.  -Please take Colace to prevent constipation from Oxycodone. Please take if you are taking Oxycodone.      Incision Care:  *Please call your doctor or nurse practitioner if you have increased pain, swelling, redness, or drainage from the incision site.  *Avoid swimming and baths until your follow-up appointment.  *You may shower, and wash surgical incisions with a mild soap and warm water. Gently pat the area dry.    Warning Signs:  Please call your doctor or nurse practitioner if you experience the following:  *You experience new chest pain, pressure, squeezing or tightness.  *New or worsening cough, shortness of breath, or wheeze.  *If you are vomiting and cannot keep down fluids or your medications.  *You are getting dehydrated due to continued vomiting, diarrhea, or other reasons. Signs of dehydration include dry mouth, rapid heartbeat, or feeling dizzy or faint when standing.  *You see blood or dark/black material when you vomit or have a bowel movement.  *You experience burning when you urinate, have blood in your urine, or experience a discharge.  *Your pain is not improving within 8-12 hours or is not gone within 24 hours. Call or return immediately if your pain is getting worse, changes location, or moves to your chest or back.  *You have shaking chills, or fever greater than 101.5 degrees Fahrenheit or 38 degrees Celsius.  *Any change in your symptoms, or any new symptoms that concern you.

## 2024-09-25 NOTE — DISCHARGE NOTE PROVIDER - NSDCMRMEDTOKEN_GEN_ALL_CORE_FT
amLODIPine 10 mg oral tablet: 1 tab(s) orally once a day  enalapril 5 mg oral tablet: 1 tab(s) orally once a day  fenofibrate 160 mg oral tablet: 1 tab(s) orally once a day  omeprazole 40 mg oral delayed release capsule: 1 cap(s) orally once a day   amLODIPine 10 mg oral tablet: 1 tab(s) orally once a day  Colace 100 mg oral capsule: 1 cap(s) orally every 12 hours as needed for  constipation  enalapril 5 mg oral tablet: 1 tab(s) orally once a day  fenofibrate 160 mg oral tablet: 1 tab(s) orally once a day  omeprazole 40 mg oral delayed release capsule: 1 cap(s) orally once a day  oxyCODONE 5 mg oral tablet: 1 tab(s) orally every 6 hours as needed for  severe pain MDD: 4

## 2024-09-25 NOTE — DISCHARGE NOTE PROVIDER - HOSPITAL COURSE
63F PMHx acute cholecystitis (tx w/ abx), gastric ulcers admitted for likely biliary colic vs symptomatic cholelithiasis, vs possible early acute cholecystitis. Pt w/ prior hx of acute cholecystitis episode x2 months ago, tx w/ abx. Pt currently afebrile, CBC WNL, no RUQ TTP on PE, negative murphys, US demonstrates cholelithiasis w/o pericholecystic fluid, no wall thickening.       55 year old female with known history of gallstones, presented to the ED with epigastric and RUQ postprandial pain x 1 day and has been persistent since then. Associated symptoms include nausea and over 10 episodes of bilious emesis and PO intolerance. Patient reports she had an episode of similar symptoms earlier in March of 2024, at that time she received symptomatic treatment and was scheduled for lap cholecystectomy on June 1st at [facility]. Patient presented with tenderness in epigastrium and RUQ, negative Jimenez sign. Labs were significant for WBC 11.73, Nicola 92.2%, BUN 8, Cr 0.45, Bili 0.4, Alk Phos 112. Imaging significant of right upper quadrant ultrasound showing CBD 4 mm, 9 mm gallstones, increased echogenicity of pericholecystic fat. Patient was admitted for surgical treatment of acute cholecystitis and is now s/p RA cholecystectomy (5/20). Patient's post-operative course was uncomplicated. Diet was advanced to low fat diet as tolerated without nausea or vomiting and post-op pain was well controlled on oral medication. Patient is ambulating without difficulty and voiding spontaneously with bowel function. This patient is deemed ready for discharge and will follow up with the surgeon in the outpatient setting. 64 y/o Yakut speaking Female patient PMHx of HTN, HLD, gastric ulcers admitted for 6/10 RUQ pain       for likely biliary colic vs symptomatic cholelithiasis, vs possible early acute cholecystitis. Pt w/ prior hx of acute cholecystitis episode x2 months ago, tx w/ abx. Pt currently afebrile, CBC WNL, no RUQ TTP on PE, negative murphys, US demonstrates cholelithiasis w/o pericholecystic fluid, no wall thickening.   62yo nasreen speaking Female pt with PMH HTN, HLD, gastric ulcers presents reporting intermittent, sharp, 6/10 RUQ pain w/ radiation to RT side/back w/ some associated nausea, episodes last 4-6 hours, unrelated to PO intake (reports some resolution w/ eating). Pt reported to PCP today who referred to ED for evaluation by Dr. Escudero. Pt reports prior hospitalization [place]stit[place] tx w/ [place]x.      55 year old female with known history of gallstones, presented to the ED with epigastric and RUQ postprandial pain x 1 day and has been persistent since then. Associated symptoms include nausea and over 10 episodes of bilious emesis and PO intolerance. Patient reports she had an episode of similar symptoms earlier in March of 2024, at that time she received symptomatic treatment and was scheduled for lap cholecystectomy on June 1st at [facility]. Patient presented with tenderness in epigastrium and RUQ, negative Jimenez sign. Labs were significant for WBC 11.73, Nicola 92.2%, BUN 8, Cr 0.45, Bili 0.4, Alk Phos 112. Imaging significant of right upper quadrant ultrasound showing CBD 4 mm, 9 mm gallstones, increased echogenicity of pericholecystic fat. Patient was admitted for surgical treatment of acute cholecystitis and is now s/p RA cholecystectomy (5/20). Patient's post-operative course was uncomplicated. Diet was advanced to low fat diet as tolerated without nausea or vomiting and post-op pain was well controlled on oral medication. Patient is ambulating without difficulty and voiding spontaneously with bowel function. This patient is deemed ready for discharge and will follow up with the surgeon in the outpatient setting. 62 y/o Romanian speaking Female patient PMHx of HTN, HLD, gastric ulcers admitted for 6/10 RUQ pain radiating to right side associated with nausea and poor PO intake. Patient was referred to ED by PCP as she reports       for likely biliary colic vs symptomatic cholelithiasis, vs possible early acute cholecystitis. Pt w/ prior hx of acute cholecystitis episode x2 months ago, tx w/ abx. Pt currently afebrile, CBC WNL, no RUQ TTP on PE, negative murphys, US demonstrates cholelithiasis w/o pericholecystic fluid, no wall thickening.   64yo Bulgarian speaking Female pt with PMH HTN, HLD, gastric ulcers presents reporting intermittent, sharp, 6/10 RUQ pain w/ radiation to RT side/back w/ some associated nausea, episodes last 4-6 hours, unrelated to PO intake (reports some resolution w/ eating). Pt reported to PCP today who referred to ED for evaluation by Dr. Escudero. Pt reports prior hospitalization for acute ch[place]cystitis [place], tx w/ abx.      55 year old female with known history of gallstones, presented to the ED with epigastric and RUQ postprandial pain x 1 day and has been persistent since then. Associated symptoms include nausea and over 10 episodes of bilious emesis and PO intolerance. Patient reports she had an episode of similar symptoms earlier in March of 2024, at that time she received symptomatic treatment and was scheduled for lap cholecystectomy on June 1st at [facility]. Patient presented with tenderness in epigastrium and RUQ, negative Jimenez sign. Labs were significant for WBC 11.73, Nicola 92.2%, BUN 8, Cr 0.45, Bili 0.4, Alk Phos 112. Imaging significant of right upper quadrant ultrasound showing CBD 4 mm, 9 mm gallstones, increased echogenicity of pericholecystic fat. Patient was admitted for surgical treatment of acute cholecystitis and is now s/p RA cholecystectomy (5/20). Patient's post-operative course was uncomplicated. Diet was advanced to low fat diet as tolerated without nausea or vomiting and post-op pain was well controlled on oral medication. Patient is ambulating without difficulty and voiding spontaneously with bowel function. This patient is deemed ready for discharge and will follow up with the surgeon in the outpatient setting. 64 y/o Senegalese speaking Female patient PMHx of HTN, HLD, gastric ulcers admitted for 6/10 RUQ pain radiating to right side associated with nausea and poor PO intake. Patient was referred to ED by PCP as she reports prior hospitalization for acute cholecystitis 2 months ago that was managed with antibiotics. Labs in the ED were significant for Hgb 11, AST/ALT 43/58, lipase 45, Tbili 0.6. RUQ US demonstrates cholelithiasis without pericholecystic fluid or wall thickening, and presence of RT lobe liver lesion, likely hemangioma.   Patient was admitted for surgical treatment for biliary colic vs symptomatic cholelithiasis vs acute cholecystitis and is now s/p RA cholecystectomy (9/25). Patient's post-operative course was uncomplicated. Diet was advanced to low fat diet as tolerated without nausea or vomiting. Post-op pain was well controlled on oral medication. Patient is ambulating without difficulty and voiding spontaneously with bowel function. This patient is deemed ready for discharge and will follow up with the surgeon in the outpatient setting.    INCOMPLETE 9/25           64 y/o Malaysian speaking Female patient PMHx of HTN, HLD, gastric ulcers admitted for 6/10 RUQ pain radiating to right side associated with nausea and poor PO intake. Patient was referred to ED by PCP as she reports prior hospitalization for acute cholecystitis 2 months ago that was managed with antibiotics. Labs in the ED were significant for Hgb 11, AST/ALT 43/58, lipase 45, Tbili 0.6. RUQ US demonstrates cholelithiasis without pericholecystic fluid or wall thickening, and presence of RT lobe liver lesion, likely hemangioma.   Patient was admitted for surgical treatment for biliary colic vs symptomatic cholelithiasis vs acute cholecystitis and is now s/p RA cholecystectomy (9/25). Patient's post-operative course was uncomplicated. Diet was advanced to low fat diet as tolerated without nausea or vomiting. Post-op pain was well controlled on oral medication. Patient is ambulating without difficulty and voiding spontaneously with bowel function. This patient is deemed ready for discharge and will follow up with the surgeon in the outpatient setting.

## 2024-09-25 NOTE — PROGRESS NOTE ADULT - SUBJECTIVE AND OBJECTIVE BOX
Patient is a 63y old  Female who presents with a chief complaint of     INTERVAL HPI/OVERNIGHT EVENTS: offers no new complaints; current symptoms resolving    MEDICATIONS  (STANDING):  heparin   Injectable 5000 Unit(s) SubCutaneous every 8 hours  lactated ringers. 1000 milliLiter(s) (100 mL/Hr) IV Continuous <Continuous>  pantoprazole  Injectable 40 milliGRAM(s) IV Push daily    MEDICATIONS  (PRN):  acetaminophen   IVPB .. 1000 milliGRAM(s) IV Intermittent once PRN Mild Pain (1 - 3), Moderate Pain (4 - 6)  HYDROmorphone  Injectable 0.5 milliGRAM(s) IV Push every 4 hours PRN Severe Pain (7 - 10)  ondansetron Injectable 4 milliGRAM(s) IV Push every 6 hours PRN Nausea      __________________________________________________  REVIEW OF SYSTEMS:    CONSTITUTIONAL: No fever,   EYES: no acute visual disturbances  NECK: No pain or stiffness  RESPIRATORY: No cough; No shortness of breath  CARDIOVASCULAR: No chest pain, no palpitations  GASTROINTESTINAL: No pain. No nausea or vomiting; No diarrhea   NEUROLOGICAL: No headache or numbness, no tremors  MUSCULOSKELETAL: No joint pain, no muscle pain  GENITOURINARY: no dysuria, no frequency, no hesitancy  PSYCHIATRY: no depression , no anxiety  ALL OTHER  ROS negative        Vital Signs Last 24 Hrs  T(C): 36.9 (25 Sep 2024 09:48), Max: 37 (24 Sep 2024 16:58)  T(F): 98.4 (25 Sep 2024 09:48), Max: 98.6 (24 Sep 2024 16:58)  HR: 61 (25 Sep 2024 09:48) (56 - 71)  BP: 121/73 (25 Sep 2024 09:48) (109/69 - 147/85)  BP(mean): 83 (24 Sep 2024 20:58) (83 - 83)  RR: 18 (25 Sep 2024 09:48) (18 - 18)  SpO2: 98% (25 Sep 2024 09:48) (94% - 98%)    Parameters below as of 25 Sep 2024 09:48  Patient On (Oxygen Delivery Method): room air        ________________________________________________  PHYSICAL EXAM:  GENERAL: NAD  HEENT: Normocephalic;  conjunctivae and sclerae clear; moist mucous membranes;   NECK : supple  CHEST/LUNG: Clear to auscultation bilaterally with good air entry   HEART: S1 S2  regular; no murmurs, gallops or rubs  ABDOMEN: Soft, Nontender, Nondistended; Bowel sounds present  EXTREMITIES: no cyanosis; no edema; no calf tenderness  SKIN: warm and dry; no rash  NERVOUS SYSTEM:  Awake and alert; Oriented  to place, person and time ; no new deficits    _________________________________________________  LABS:                        10.9   5.44  )-----------( 381      ( 25 Sep 2024 05:36 )             33.8     09-25    139  |  103  |  11  ----------------------------<  90  4.0   |  26  |  0.85    Ca    9.5      25 Sep 2024 05:36  Phos  3.8     09-25  Mg     2.1     09-25    TPro  8.0  /  Alb  4.1  /  TBili  0.7  /  DBili  x   /  AST  43[H]  /  ALT  54[H]  /  AlkPhos  112  09-25    PT/INR - ( 24 Sep 2024 05:30 )   PT: 11.0 sec;   INR: 0.96          PTT - ( 24 Sep 2024 05:30 )  PTT:44.8 sec  Urinalysis Basic - ( 25 Sep 2024 05:36 )    Color: x / Appearance: x / SG: x / pH: x  Gluc: 90 mg/dL / Ketone: x  / Bili: x / Urobili: x   Blood: x / Protein: x / Nitrite: x   Leuk Esterase: x / RBC: x / WBC x   Sq Epi: x / Non Sq Epi: x / Bacteria: x      CAPILLARY BLOOD GLUCOSE            RADIOLOGY & ADDITIONAL TESTS:      Plan of care was discussed with patient and /or primary care giver; all questions and concerns were addressed and care was aligned with patient's wishes.       Patient is a 63y old  Female who presents with a chief complaint of     INTERVAL HPI/OVERNIGHT EVENTS: No acute overnight events. Denies abd pain/N/V or any other complaints     MEDICATIONS  (STANDING):  heparin   Injectable 5000 Unit(s) SubCutaneous every 8 hours  lactated ringers. 1000 milliLiter(s) (100 mL/Hr) IV Continuous <Continuous>  pantoprazole  Injectable 40 milliGRAM(s) IV Push daily    MEDICATIONS  (PRN):  acetaminophen   IVPB .. 1000 milliGRAM(s) IV Intermittent once PRN Mild Pain (1 - 3), Moderate Pain (4 - 6)  HYDROmorphone  Injectable 0.5 milliGRAM(s) IV Push every 4 hours PRN Severe Pain (7 - 10)  ondansetron Injectable 4 milliGRAM(s) IV Push every 6 hours PRN Nausea      Vital Signs Last 24 Hrs  T(C): 36.9 (25 Sep 2024 09:48), Max: 37 (24 Sep 2024 16:58)  T(F): 98.4 (25 Sep 2024 09:48), Max: 98.6 (24 Sep 2024 16:58)  HR: 61 (25 Sep 2024 09:48) (56 - 71)  BP: 121/73 (25 Sep 2024 09:48) (109/69 - 147/85)  BP(mean): 83 (24 Sep 2024 20:58) (83 - 83)  RR: 18 (25 Sep 2024 09:48) (18 - 18)  SpO2: 98% (25 Sep 2024 09:48) (94% - 98%)    Parameters below as of 25 Sep 2024 09:48  Patient On (Oxygen Delivery Method): room air        ________________________________________________  PHYSICAL EXAM:  GENERAL: NAD  HEENT: Normocephalic;  conjunctivae and sclerae clear; moist mucous membranes;   NECK : supple  CHEST/LUNG: Clear to auscultation bilaterally with good air entry   HEART: S1 S2  regular; no murmurs, gallops or rubs  ABDOMEN: Soft, Nontender, Nondistended; Bowel sounds present  EXTREMITIES: no cyanosis; no edema; no calf tenderness  SKIN: warm and dry; no rash  NERVOUS SYSTEM:  Awake and alert; Oriented  to place, person and time ; no new deficits    _________________________________________________  LABS:                        10.9   5.44  )-----------( 381      ( 25 Sep 2024 05:36 )             33.8     09-25    139  |  103  |  11  ----------------------------<  90  4.0   |  26  |  0.85    Ca    9.5      25 Sep 2024 05:36  Phos  3.8     09-25  Mg     2.1     09-25    TPro  8.0  /  Alb  4.1  /  TBili  0.7  /  DBili  x   /  AST  43[H]  /  ALT  54[H]  /  AlkPhos  112  09-25    PT/INR - ( 24 Sep 2024 05:30 )   PT: 11.0 sec;   INR: 0.96          PTT - ( 24 Sep 2024 05:30 )  PTT:44.8 sec  Urinalysis Basic - ( 25 Sep 2024 05:36 )    Color: x / Appearance: x / SG: x / pH: x  Gluc: 90 mg/dL / Ketone: x  / Bili: x / Urobili: x   Blood: x / Protein: x / Nitrite: x   Leuk Esterase: x / RBC: x / WBC x   Sq Epi: x / Non Sq Epi: x / Bacteria: x      CAPILLARY BLOOD GLUCOSE            RADIOLOGY & ADDITIONAL TESTS:      Plan of care was discussed with patient and /or primary care giver; all questions and concerns were addressed and care was aligned with patient's wishes.

## 2024-09-25 NOTE — DISCHARGE NOTE PROVIDER - NSDCCPCAREPLAN_GEN_ALL_CORE_FT
PRINCIPAL DISCHARGE DIAGNOSIS  Diagnosis: Symptomatic cholelithiasis  Assessment and Plan of Treatment:

## 2024-09-25 NOTE — DISCHARGE NOTE PROVIDER - INSTRUCTIONS
Please continue a low fat diet, this can include:  Cereals, whole grains, and whole grain pasta products  corn or whole wheat tortillas, baked crackers, noodles, especially whole grain versions, oatmeal, rice, English muffins    Dairy products can be high in fat, but food manufacturers often offer lower fat versions. These include: fat free cheese or yogurt or cream cheese    Protein sources: Tofu, beans, lentils, egg whites, lean cuts of meat, lentils, tuna, peas, shrimp, skinless chicken or turkey breast  veggie burgers, Fruits and vegetables are naturally low fat. Choose fresh, frozen, or canned options.

## 2024-09-25 NOTE — PROGRESS NOTE ADULT - SUBJECTIVE AND OBJECTIVE BOX
SUBJECTIVE: Patient seen and examined bedside by chief resident resting comfortably in bed (using English ). Patient states pain is well controlled. Denies nausea or vomiting. Denies bowel function. Denies chest pain, SOB, HA, fever, chills, dizziness.     heparin   Injectable 5000 Unit(s) SubCutaneous every 8 hours    MEDICATIONS  (PRN):  acetaminophen   IVPB .. 1000 milliGRAM(s) IV Intermittent once PRN Mild Pain (1 - 3), Moderate Pain (4 - 6)  HYDROmorphone  Injectable 0.5 milliGRAM(s) IV Push every 4 hours PRN Severe Pain (7 - 10)  ondansetron Injectable 4 milliGRAM(s) IV Push every 6 hours PRN Nausea      I&O's Detail    24 Sep 2024 07:01  -  25 Sep 2024 07:00  --------------------------------------------------------  IN:    Lactated Ringers: 1000 mL  Total IN: 1000 mL    OUT:    Oral Fluid: 0 mL    Voided (mL): 1200 mL  Total OUT: 1200 mL    Total NET: -200 mL          Vital Signs Last 24 Hrs  T(C): 36.8 (25 Sep 2024 05:12), Max: 37 (24 Sep 2024 16:58)  T(F): 98.3 (25 Sep 2024 05:12), Max: 98.6 (24 Sep 2024 16:58)  HR: 63 (25 Sep 2024 05:12) (53 - 71)  BP: 147/85 (25 Sep 2024 05:12) (109/69 - 147/85)  BP(mean): 83 (24 Sep 2024 20:58) (83 - 83)  RR: 18 (25 Sep 2024 05:12) (17 - 18)  SpO2: 96% (25 Sep 2024 05:12) (94% - 98%)    Parameters below as of 25 Sep 2024 05:12  Patient On (Oxygen Delivery Method): room air        General: NAD, resting comfortably in bed  C/V: NSR  Pulm: Nonlabored breathing, no respiratory distress  Abd: soft, +mild tenderness to deep palpation in RUQ, nondistended, No rebound or guarding  Extrem: WWP, no edema, SCDs in place    LABS:                        10.9   5.44  )-----------( 381      ( 25 Sep 2024 05:36 )             33.8     09-25    139  |  103  |  11  ----------------------------<  90  4.0   |  26  |  0.85    Ca    9.5      25 Sep 2024 05:36  Phos  3.8     09-25  Mg     2.1     09-25    TPro  8.0  /  Alb  4.1  /  TBili  0.7  /  DBili  x   /  AST  43[H]  /  ALT  54[H]  /  AlkPhos  112  09-25    PT/INR - ( 24 Sep 2024 05:30 )   PT: 11.0 sec;   INR: 0.96          PTT - ( 24 Sep 2024 05:30 )  PTT:44.8 sec  Urinalysis Basic - ( 25 Sep 2024 05:36 )    Color: x / Appearance: x / SG: x / pH: x  Gluc: 90 mg/dL / Ketone: x  / Bili: x / Urobili: x   Blood: x / Protein: x / Nitrite: x   Leuk Esterase: x / RBC: x / WBC x   Sq Epi: x / Non Sq Epi: x / Bacteria: x        RADIOLOGY & ADDITIONAL STUDIES:

## 2024-09-25 NOTE — DISCHARGE NOTE PROVIDER - NSDCACTIVITY_GEN_ALL_CORE
Showering allowed/Stairs allowed/Walking - Indoors allowed/No heavy lifting/straining/Activity as tolerated

## 2024-09-26 LAB
ALBUMIN SERPL ELPH-MCNC: 4.1 G/DL — SIGNIFICANT CHANGE UP (ref 3.3–5)
ALP SERPL-CCNC: 116 U/L — SIGNIFICANT CHANGE UP (ref 40–120)
ALT FLD-CCNC: 57 U/L — HIGH (ref 10–45)
ANION GAP SERPL CALC-SCNC: 12 MMOL/L — SIGNIFICANT CHANGE UP (ref 5–17)
AST SERPL-CCNC: 50 U/L — HIGH (ref 10–40)
BILIRUB SERPL-MCNC: 0.6 MG/DL — SIGNIFICANT CHANGE UP (ref 0.2–1.2)
BUN SERPL-MCNC: 10 MG/DL — SIGNIFICANT CHANGE UP (ref 7–23)
CALCIUM SERPL-MCNC: 9.5 MG/DL — SIGNIFICANT CHANGE UP (ref 8.4–10.5)
CHLORIDE SERPL-SCNC: 105 MMOL/L — SIGNIFICANT CHANGE UP (ref 96–108)
CO2 SERPL-SCNC: 24 MMOL/L — SIGNIFICANT CHANGE UP (ref 22–31)
CREAT SERPL-MCNC: 0.77 MG/DL — SIGNIFICANT CHANGE UP (ref 0.5–1.3)
EGFR: 88 ML/MIN/1.73M2 — SIGNIFICANT CHANGE UP
GLUCOSE SERPL-MCNC: 93 MG/DL — SIGNIFICANT CHANGE UP (ref 70–99)
HCT VFR BLD CALC: 34.3 % — LOW (ref 34.5–45)
HGB BLD-MCNC: 10.9 G/DL — LOW (ref 11.5–15.5)
MAGNESIUM SERPL-MCNC: 2.1 MG/DL — SIGNIFICANT CHANGE UP (ref 1.6–2.6)
MCHC RBC-ENTMCNC: 27.5 PG — SIGNIFICANT CHANGE UP (ref 27–34)
MCHC RBC-ENTMCNC: 31.8 GM/DL — LOW (ref 32–36)
MCV RBC AUTO: 86.4 FL — SIGNIFICANT CHANGE UP (ref 80–100)
NRBC # BLD: 0 /100 WBCS — SIGNIFICANT CHANGE UP (ref 0–0)
PLATELET # BLD AUTO: 383 K/UL — SIGNIFICANT CHANGE UP (ref 150–400)
POTASSIUM SERPL-MCNC: 3.8 MMOL/L — SIGNIFICANT CHANGE UP (ref 3.5–5.3)
POTASSIUM SERPL-SCNC: 3.8 MMOL/L — SIGNIFICANT CHANGE UP (ref 3.5–5.3)
PROT SERPL-MCNC: 7.7 G/DL — SIGNIFICANT CHANGE UP (ref 6–8.3)
RBC # BLD: 3.97 M/UL — SIGNIFICANT CHANGE UP (ref 3.8–5.2)
RBC # FLD: 13.8 % — SIGNIFICANT CHANGE UP (ref 10.3–14.5)
SODIUM SERPL-SCNC: 141 MMOL/L — SIGNIFICANT CHANGE UP (ref 135–145)
WBC # BLD: 5.43 K/UL — SIGNIFICANT CHANGE UP (ref 3.8–10.5)
WBC # FLD AUTO: 5.43 K/UL — SIGNIFICANT CHANGE UP (ref 3.8–10.5)

## 2024-09-26 PROCEDURE — 88304 TISSUE EXAM BY PATHOLOGIST: CPT | Mod: 26

## 2024-09-26 DEVICE — SURGICEL 2 X 14": Type: IMPLANTABLE DEVICE | Status: FUNCTIONAL

## 2024-09-26 DEVICE — LIGATING CLIPS WECK HEMOLOK POLYMER LARGE (PURPLE) 6: Type: IMPLANTABLE DEVICE | Status: FUNCTIONAL

## 2024-09-26 DEVICE — SURGICEL POWDER 3 GRAMS: Type: IMPLANTABLE DEVICE | Status: FUNCTIONAL

## 2024-09-26 DEVICE — LIGATING CLIPS WECK HEMOLOK POLYMER MEDIUM-LARGE (GREEN) 6: Type: IMPLANTABLE DEVICE | Status: FUNCTIONAL

## 2024-09-26 RX ORDER — HYDROMORPHONE HYDROCHLORIDE 1 MG/ML
0.25 INJECTION, SOLUTION INTRAMUSCULAR; INTRAVENOUS; SUBCUTANEOUS EVERY 4 HOURS
Refills: 0 | Status: DISCONTINUED | OUTPATIENT
Start: 2024-09-26 | End: 2024-09-27

## 2024-09-26 RX ORDER — KETOROLAC TROMETHAMINE 10 MG/1
15 TABLET, FILM COATED ORAL EVERY 6 HOURS
Refills: 0 | Status: DISCONTINUED | OUTPATIENT
Start: 2024-09-26 | End: 2024-09-27

## 2024-09-26 RX ORDER — PANTOPRAZOLE SODIUM 40 MG/1
40 TABLET, DELAYED RELEASE ORAL EVERY 24 HOURS
Refills: 0 | Status: DISCONTINUED | OUTPATIENT
Start: 2024-09-26 | End: 2024-09-27

## 2024-09-26 RX ORDER — OXYCODONE HYDROCHLORIDE 30 MG/1
2.5 TABLET, FILM COATED, EXTENDED RELEASE ORAL EVERY 6 HOURS
Refills: 0 | Status: DISCONTINUED | OUTPATIENT
Start: 2024-09-26 | End: 2024-09-27

## 2024-09-26 RX ORDER — ACETAMINOPHEN 325 MG
650 TABLET ORAL EVERY 6 HOURS
Refills: 0 | Status: DISCONTINUED | OUTPATIENT
Start: 2024-09-26 | End: 2024-09-27

## 2024-09-26 RX ORDER — OXYCODONE HYDROCHLORIDE 30 MG/1
5 TABLET, FILM COATED, EXTENDED RELEASE ORAL EVERY 6 HOURS
Refills: 0 | Status: DISCONTINUED | OUTPATIENT
Start: 2024-09-26 | End: 2024-09-27

## 2024-09-26 RX ORDER — ONDANSETRON HCL/PF 4 MG/2 ML
4 VIAL (ML) INJECTION EVERY 6 HOURS
Refills: 0 | Status: DISCONTINUED | OUTPATIENT
Start: 2024-09-26 | End: 2024-09-27

## 2024-09-26 RX ADMIN — Medication 5000 UNIT(S): at 21:37

## 2024-09-26 RX ADMIN — Medication 650 MILLIGRAM(S): at 17:36

## 2024-09-26 RX ADMIN — KETOROLAC TROMETHAMINE 15 MILLIGRAM(S): 10 TABLET, FILM COATED ORAL at 21:36

## 2024-09-26 RX ADMIN — PANTOPRAZOLE SODIUM 40 MILLIGRAM(S): 40 TABLET, DELAYED RELEASE ORAL at 14:23

## 2024-09-26 RX ADMIN — Medication 4 MILLIGRAM(S): at 14:16

## 2024-09-26 RX ADMIN — HYDROMORPHONE HYDROCHLORIDE 0.25 MILLIGRAM(S): 1 INJECTION, SOLUTION INTRAMUSCULAR; INTRAVENOUS; SUBCUTANEOUS at 14:24

## 2024-09-26 RX ADMIN — HYDROMORPHONE HYDROCHLORIDE 0.25 MILLIGRAM(S): 1 INJECTION, SOLUTION INTRAMUSCULAR; INTRAVENOUS; SUBCUTANEOUS at 14:17

## 2024-09-26 RX ADMIN — Medication 100 MILLILITER(S): at 03:33

## 2024-09-26 RX ADMIN — Medication 5000 UNIT(S): at 05:04

## 2024-09-26 NOTE — PROGRESS NOTE ADULT - SUBJECTIVE AND OBJECTIVE BOX
SUBJECTIVE: Pt seen and examined by chief resident. Pt is doing well, resting comfortably on bed. Pain controlled. No nausea or vomiting. No complaints at this time.    Vital Signs Last 24 Hrs  T(C): 37.3 (26 Sep 2024 04:44), Max: 37.3 (26 Sep 2024 04:44)  T(F): 99.1 (26 Sep 2024 04:44), Max: 99.1 (26 Sep 2024 04:44)  HR: 62 (26 Sep 2024 04:44) (59 - 64)  BP: 121/77 (26 Sep 2024 04:44) (107/68 - 127/74)  BP(mean): --  RR: 18 (26 Sep 2024 04:44) (17 - 18)  SpO2: 97% (26 Sep 2024 04:44) (95% - 98%)    Parameters below as of 26 Sep 2024 04:44  Patient On (Oxygen Delivery Method): room air        I&O's Summary    25 Sep 2024 07:01  -  26 Sep 2024 07:00  --------------------------------------------------------  IN: 1940 mL / OUT: 400 mL / NET: 1540 mL        Physical Exam:  General Appearance: Appears well, NAD  Pulmonary: Nonlabored breathing, no respiratory distress  Abdomen: Soft, nondistended nontender, no rebound or guarding  Extremities: WWP, SCD's in place     LABS:                        10.9   5.43  )-----------( 383      ( 26 Sep 2024 05:30 )             34.3     09-26    141  |  105  |  10  ----------------------------<  93  3.8   |  24  |  0.77    Ca    9.5      26 Sep 2024 05:30  Phos  3.8     09-25  Mg     2.1     09-26    TPro  7.7  /  Alb  4.1  /  TBili  0.6  /  DBili  x   /  AST  50[H]  /  ALT  57[H]  /  AlkPhos  116  09-26      Urinalysis Basic - ( 26 Sep 2024 05:30 )    Color: x / Appearance: x / SG: x / pH: x  Gluc: 93 mg/dL / Ketone: x  / Bili: x / Urobili: x   Blood: x / Protein: x / Nitrite: x   Leuk Esterase: x / RBC: x / WBC x   Sq Epi: x / Non Sq Epi: x / Bacteria: x

## 2024-09-26 NOTE — PROGRESS NOTE ADULT - SUBJECTIVE AND OBJECTIVE BOX
POST-OPERATIVE NOTE    Procedure: Lap Luz Maria    Diagnosis/Indication: Acute on chronic cholecystitis     Surgeon: Dr. Escudero    S: Patient seen and examined bedside in PACU. Patient appears slightly sleepy but well responsive to questions with Gilmer . Patient endorses abdominal pain localized to incisional sites. Denies nausea or vomiting. Denies chest pain, SOB, TAYLOR, HA, dizziness, fever, chills, calf tenderness. Pain controlled with medication. Denies nausea, vomiting.    O:  T(C): 37.1 (09-26-24 @ 15:37), Max: 37.2 (09-26-24 @ 13:37)  T(F): 98.8 (09-26-24 @ 15:37), Max: 99 (09-26-24 @ 13:37)  HR: 71 (09-26-24 @ 15:37) (71 - 79)  BP: 141/82 (09-26-24 @ 15:37) (128/71 - 164/82)  RR: 16 (09-26-24 @ 15:37) (12 - 21)  SpO2: 95% (09-26-24 @ 15:37) (93% - 97%)  Wt(kg): --                        10.9   5.43  )-----------( 383      ( 26 Sep 2024 05:30 )             34.3     09-26    141  |  105  |  10  ----------------------------<  93  3.8   |  24  |  0.77    Ca    9.5      26 Sep 2024 05:30  Phos  3.8     09-25  Mg     2.1     09-26    TPro  7.7  /  Alb  4.1  /  TBili  0.6  /  DBili  x   /  AST  50[H]  /  ALT  57[H]  /  AlkPhos  116  09-26      Gen: NAD, resting comfortably in bed  C/V: NSR  Pulm: Nonlabored breathing, no respiratory distress  Abd: soft, +mildly tender to R port site, non-distended. Incisions x 3 - covered in dermabond, clean dry and intact. No signs of surrounding erythema, bleeding, induration, seroma, hematoma.   Extrem: WWP, no calf edema or tenderness, SCDs in place

## 2024-09-26 NOTE — BRIEF OPERATIVE NOTE - OPERATION/FINDINGS
Abdomen entered via infraumbilical cutdown. Additional ports placed under laparoscopic guidance. Colon and duodenum adhesions overlying liver and gallbladder taken down with cautery, blunt and sharp dissection. Critical view of safety identified. Cystic artery clipped x3 and cut. Cystic duct clipped x3 and cut. Gallbladder removed from fossa using hook cautery. Removed from abdomen with EndoCatch. Hemostasis confirmed. Surgicel powder left in GB fossa. Fascia closed with Weck device using  0 vicryl. Skin closed with Monocryl and Dermabond.

## 2024-09-27 VITALS
RESPIRATION RATE: 18 BRPM | DIASTOLIC BLOOD PRESSURE: 70 MMHG | TEMPERATURE: 98 F | OXYGEN SATURATION: 93 % | SYSTOLIC BLOOD PRESSURE: 112 MMHG | HEART RATE: 68 BPM

## 2024-09-27 LAB
ALBUMIN SERPL ELPH-MCNC: 4.1 G/DL — SIGNIFICANT CHANGE UP (ref 3.3–5)
ALP SERPL-CCNC: 123 U/L — HIGH (ref 40–120)
ALT FLD-CCNC: 97 U/L — HIGH (ref 10–45)
ANION GAP SERPL CALC-SCNC: 13 MMOL/L — SIGNIFICANT CHANGE UP (ref 5–17)
AST SERPL-CCNC: 106 U/L — HIGH (ref 10–40)
BILIRUB DIRECT SERPL-MCNC: 0.3 MG/DL — SIGNIFICANT CHANGE UP (ref 0–0.3)
BILIRUB INDIRECT FLD-MCNC: 0.4 MG/DL — SIGNIFICANT CHANGE UP (ref 0.2–1)
BILIRUB SERPL-MCNC: 0.7 MG/DL — SIGNIFICANT CHANGE UP (ref 0.2–1.2)
BUN SERPL-MCNC: 12 MG/DL — SIGNIFICANT CHANGE UP (ref 7–23)
CALCIUM SERPL-MCNC: 9.1 MG/DL — SIGNIFICANT CHANGE UP (ref 8.4–10.5)
CHLORIDE SERPL-SCNC: 101 MMOL/L — SIGNIFICANT CHANGE UP (ref 96–108)
CO2 SERPL-SCNC: 24 MMOL/L — SIGNIFICANT CHANGE UP (ref 22–31)
CREAT SERPL-MCNC: 0.88 MG/DL — SIGNIFICANT CHANGE UP (ref 0.5–1.3)
EGFR: 75 ML/MIN/1.73M2 — SIGNIFICANT CHANGE UP
GLUCOSE SERPL-MCNC: 130 MG/DL — HIGH (ref 70–99)
HCT VFR BLD CALC: 33.3 % — LOW (ref 34.5–45)
HGB BLD-MCNC: 11.1 G/DL — LOW (ref 11.5–15.5)
MAGNESIUM SERPL-MCNC: 1.8 MG/DL — SIGNIFICANT CHANGE UP (ref 1.6–2.6)
MAGNESIUM SERPL-MCNC: 2.4 MG/DL — SIGNIFICANT CHANGE UP (ref 1.6–2.6)
MCHC RBC-ENTMCNC: 29.4 PG — SIGNIFICANT CHANGE UP (ref 27–34)
MCHC RBC-ENTMCNC: 33.3 GM/DL — SIGNIFICANT CHANGE UP (ref 32–36)
MCV RBC AUTO: 88.3 FL — SIGNIFICANT CHANGE UP (ref 80–100)
NRBC # BLD: 0 /100 WBCS — SIGNIFICANT CHANGE UP (ref 0–0)
PHOSPHATE SERPL-MCNC: 3.3 MG/DL — SIGNIFICANT CHANGE UP (ref 2.5–4.5)
PLATELET # BLD AUTO: 355 K/UL — SIGNIFICANT CHANGE UP (ref 150–400)
POTASSIUM SERPL-MCNC: 3.6 MMOL/L — SIGNIFICANT CHANGE UP (ref 3.5–5.3)
POTASSIUM SERPL-SCNC: 3.6 MMOL/L — SIGNIFICANT CHANGE UP (ref 3.5–5.3)
PROT SERPL-MCNC: 7.8 G/DL — SIGNIFICANT CHANGE UP (ref 6–8.3)
RBC # BLD: 3.77 M/UL — LOW (ref 3.8–5.2)
RBC # FLD: 14 % — SIGNIFICANT CHANGE UP (ref 10.3–14.5)
SODIUM SERPL-SCNC: 138 MMOL/L — SIGNIFICANT CHANGE UP (ref 135–145)
WBC # BLD: 14.36 K/UL — HIGH (ref 3.8–10.5)
WBC # FLD AUTO: 14.36 K/UL — HIGH (ref 3.8–10.5)

## 2024-09-27 PROCEDURE — 76705 ECHO EXAM OF ABDOMEN: CPT

## 2024-09-27 PROCEDURE — 86901 BLOOD TYPING SEROLOGIC RH(D): CPT

## 2024-09-27 PROCEDURE — 96375 TX/PRO/DX INJ NEW DRUG ADDON: CPT

## 2024-09-27 PROCEDURE — 96374 THER/PROPH/DIAG INJ IV PUSH: CPT

## 2024-09-27 PROCEDURE — 88304 TISSUE EXAM BY PATHOLOGIST: CPT

## 2024-09-27 PROCEDURE — 84100 ASSAY OF PHOSPHORUS: CPT

## 2024-09-27 PROCEDURE — 86850 RBC ANTIBODY SCREEN: CPT

## 2024-09-27 PROCEDURE — 80048 BASIC METABOLIC PNL TOTAL CA: CPT

## 2024-09-27 PROCEDURE — 86905 BLOOD TYPING RBC ANTIGENS: CPT

## 2024-09-27 PROCEDURE — 85610 PROTHROMBIN TIME: CPT

## 2024-09-27 PROCEDURE — 85730 THROMBOPLASTIN TIME PARTIAL: CPT

## 2024-09-27 PROCEDURE — 83735 ASSAY OF MAGNESIUM: CPT

## 2024-09-27 PROCEDURE — 80053 COMPREHEN METABOLIC PANEL: CPT

## 2024-09-27 PROCEDURE — 86900 BLOOD TYPING SEROLOGIC ABO: CPT

## 2024-09-27 PROCEDURE — 96361 HYDRATE IV INFUSION ADD-ON: CPT

## 2024-09-27 PROCEDURE — 86880 COOMBS TEST DIRECT: CPT

## 2024-09-27 PROCEDURE — C1889: CPT

## 2024-09-27 PROCEDURE — 85025 COMPLETE CBC W/AUTO DIFF WBC: CPT

## 2024-09-27 PROCEDURE — 83690 ASSAY OF LIPASE: CPT

## 2024-09-27 PROCEDURE — 86870 RBC ANTIBODY IDENTIFICATION: CPT

## 2024-09-27 PROCEDURE — 85027 COMPLETE CBC AUTOMATED: CPT

## 2024-09-27 PROCEDURE — 99285 EMERGENCY DEPT VISIT HI MDM: CPT

## 2024-09-27 PROCEDURE — 80076 HEPATIC FUNCTION PANEL: CPT

## 2024-09-27 PROCEDURE — 36415 COLL VENOUS BLD VENIPUNCTURE: CPT

## 2024-09-27 RX ORDER — DOCUSATE SODIUM 100 MG
1 CAPSULE ORAL
Qty: 14 | Refills: 0
Start: 2024-09-27 | End: 2024-10-03

## 2024-09-27 RX ORDER — OXYCODONE HYDROCHLORIDE 30 MG/1
1 TABLET, FILM COATED, EXTENDED RELEASE ORAL
Qty: 8 | Refills: 0
Start: 2024-09-27 | End: 2024-09-29

## 2024-09-27 RX ORDER — MAGNESIUM SULFATE 500 MG/ML
1 VIAL (ML) INJECTION ONCE
Refills: 0 | Status: COMPLETED | OUTPATIENT
Start: 2024-09-27 | End: 2024-09-27

## 2024-09-27 RX ADMIN — Medication 650 MILLIGRAM(S): at 05:21

## 2024-09-27 RX ADMIN — Medication 20 MILLIEQUIVALENT(S): at 08:34

## 2024-09-27 RX ADMIN — KETOROLAC TROMETHAMINE 15 MILLIGRAM(S): 10 TABLET, FILM COATED ORAL at 08:34

## 2024-09-27 RX ADMIN — Medication 650 MILLIGRAM(S): at 13:13

## 2024-09-27 RX ADMIN — Medication 100 GRAM(S): at 08:36

## 2024-09-27 RX ADMIN — Medication 5000 UNIT(S): at 05:21

## 2024-09-27 NOTE — PROGRESS NOTE ADULT - ASSESSMENT
63F PMHx acute cholecystitis (tx w/ abx), gastric ulcers admitted for likely biliary colic vs symptomatic cholelithiasis, vs possible early acute cholecystitis. Pt w/ prior hx of acute cholecystitis episode x2 months ago, tx w/ abx. Pt currently afebrile, CBC WNL, no RUQ TTP on PE, negative murphys, US demonstrates cholelithiasis w/o pericholecystic fluid, no wall thickening.     NPO/IVF  Pain/Nausea PRN  OOBA/IS/SCD/SQH  AM labs  OR today 
63F PMHx acute cholecystitis (tx w/ abx), gastric ulcers admitted for likely biliary colic vs symptomatic cholelithiasis, vs possible early acute cholecystitis. Pt w/ prior hx of acute cholecystitis episode x2 months ago, tx w/ abx. Pt currently afebrile, CBC WNL, no RUQ TTP on PE, negative murphys, US demonstrates cholelithiasis w/o pericholecystic fluid, no wall thickening.     NPO/IVF  Pain/Nausea PRN  OOBA/IS/SCD/SQH x1 dose  AM/Pre-op labs  Home meds as appropriate   OR for lap sonia
63F PMHx acute cholecystitis (tx w/ abx), gastric ulcers admitted for likely biliary colic vs symptomatic cholelithiasis, vs possible early acute cholecystitis. Pt w/ prior hx of acute cholecystitis episode x2 months ago, tx w/ abx. Pt currently afebrile, CBC WNL, no RUQ TTP on PE, negative murphys, US demonstrates cholelithiasis w/o pericholecystic fluid, no wall thickening. Now s/p lap sonia 9/26.     low fat  Pain/Nausea PRN  OOBA/IS/SCD/SQH  AM labs  
63F PMHx acute cholecystitis (tx w/ abx), gastric ulcers admitted for likely biliary colic vs symptomatic cholelithiasis, vs possible early acute cholecystitis. Pt w/ prior hx of acute cholecystitis episode x2 months ago, tx w/ abx. Pt currently afebrile, CBC WNL, no RUQ TTP on PE, negative murphys, US demonstrates cholelithiasis w/o pericholecystic fluid, no wall thickening. Now s/p lap sonia 9/26.     low fat  Pain/Nausea PRN  OOBA/IS/SCD/SQH  AM labs  D/c likely tomorrow with no needs
63F PMHx acute cholecystitis (tx w/ abx), gastric ulcers admitted for likely biliary colic vs symptomatic cholelithiasis, vs possible early acute cholecystitis. Pt w/ prior hx of acute cholecystitis episode x2 months ago, tx w/ abx. Pt currently afebrile, CBC WNL, no RUQ TTP on PE, negative murphys, US demonstrates cholelithiasis w/o pericholecystic fluid, no wall thickening.     CLD/NPO@mn/IVF  Pain/Nausea PRN  OOBA/IS/SCD/SQH  AM labs  OR
63F PMHx acute cholecystitis (tx w/ abx), gastric ulcers, HTN, HLD admitted for likely biliary colic vs possible early acute cholecystitis. Admitted for cholecystitis     #Cholecystitis   #Transamaninitis   Plan for OR today   NPO on IVF   Pain management with tylenol and dilaudid   Management as per surgery team     #Hx of gastric ulcer   On omeprazole   Continue pantoprazole while admitted     #HTN   #HLD  On Fenofibrate 160 qd, Enalapril 5 mg qd, Amlodipine 10 mg qd at home   Resume home meds when able to take PO (with hold parameters)    SCD boot for DVT ppx

## 2024-09-27 NOTE — DISCHARGE NOTE NURSING/CASE MANAGEMENT/SOCIAL WORK - NSDCPETBCESMAN_GEN_ALL_CORE
Nutrition, metabolism, and development symptoms If you are a smoker, it is important for your health to stop smoking. Please be aware that second hand smoke is also harmful. Type 2 diabetes mellitus

## 2024-09-27 NOTE — DISCHARGE NOTE NURSING/CASE MANAGEMENT/SOCIAL WORK - PATIENT PORTAL LINK FT
You can access the FollowMyHealth Patient Portal offered by [facility] by registering at the following website: [email or web address]. By joining [facility]’s FollowMyHealth portal, you will also be able to view your health information using other applications (apps) compatible with our system.

## 2024-09-27 NOTE — PROGRESS NOTE ADULT - SUBJECTIVE AND OBJECTIVE BOX
STATUS POST:  Laparoscopic cholecystectomy        POST OPERATIVE DAY #: 1    SUBJECTIVE: Pt seen and examined by chief resident. Pt is doing well, resting comfortably on bed. Pain controlled. Tolerated some food overnight. No nausea or vomiting. No complaints at this time.    Vital Signs Last 24 Hrs  T(C): 36.3 (27 Sep 2024 05:05), Max: 37.2 (26 Sep 2024 08:30)  T(F): 97.4 (27 Sep 2024 05:05), Max: 99 (26 Sep 2024 08:30)  HR: 68 (27 Sep 2024 05:05) (66 - 79)  BP: 120/78 (27 Sep 2024 05:05) (117/75 - 164/82)  BP(mean): 105 (26 Sep 2024 15:37) (83 - 118)  RR: 17 (27 Sep 2024 05:05) (1 - 21)  SpO2: 96% (27 Sep 2024 05:05) (93% - 97%)    Parameters below as of 27 Sep 2024 05:05  Patient On (Oxygen Delivery Method): room air        I&O's Summary    26 Sep 2024 07:01  -  27 Sep 2024 07:00  --------------------------------------------------------  IN: 200 mL / OUT: 1500 mL / NET: -1300 mL        Physical Exam:  General Appearance: Appears well, NAD  Pulmonary: Nonlabored breathing, no respiratory distress  Abdomen: Soft, nondistended appropriate incisional tenderness, incisions clean and dry and intact  Extremities: WWP, SCD's in place     LABS:                        11.1   14.36 )-----------( 355      ( 27 Sep 2024 06:46 )             33.3     09-26    141  |  105  |  10  ----------------------------<  93  3.8   |  24  |  0.77    Ca    9.5      26 Sep 2024 05:30  Mg     2.1     09-26    TPro  7.7  /  Alb  4.1  /  TBili  0.6  /  DBili  x   /  AST  50[H]  /  ALT  57[H]  /  AlkPhos  116  09-26      Urinalysis Basic - ( 26 Sep 2024 05:30 )    Color: x / Appearance: x / SG: x / pH: x  Gluc: 93 mg/dL / Ketone: x  / Bili: x / Urobili: x   Blood: x / Protein: x / Nitrite: x   Leuk Esterase: x / RBC: x / WBC x   Sq Epi: x / Non Sq Epi: x / Bacteria: x

## 2024-10-03 LAB — SURGICAL PATHOLOGY STUDY: SIGNIFICANT CHANGE UP

## 2025-05-02 NOTE — ED ADULT NURSE NOTE - FINAL NURSING ELECTRONIC SIGNATURE
your pmd,   Phone: (   )    -  Fax: (   )    -  Follow Up Time:     Nahid Sibley  Orthopaedic Surgery  [address] [place] [address]  Phone: [phone]  Fax: [phone]  Follow Up Time:   
24-Sep-2024 00:35

## (undated) DEVICE — TROCAR COVIDIEN VERSAONE BLUNT TIP HASSAN 12MM

## (undated) DEVICE — ELCTR CORD FOOTSWITCH 1PLR LAPSCP 10FT

## (undated) DEVICE — TUBING STRYKER PNEUMOSURE HI FLOW INSUFFLATOR

## (undated) DEVICE — GLV 8 PROTEXIS (WHITE)

## (undated) DEVICE — VENODYNE/SCD SLEEVE CALF MEDIUM

## (undated) DEVICE — CLIPPER BLADE GENERAL USE

## (undated) DEVICE — SUT MONOCRYL 4-0 18" PS-2

## (undated) DEVICE — DRSG DERMABOND 0.7ML

## (undated) DEVICE — Device

## (undated) DEVICE — WARMING BLANKET UPPER ADULT

## (undated) DEVICE — POSITIONER FOAM EGG CRATE ULNAR 2PCS (PINK)

## (undated) DEVICE — LIGASURE MARYLAND 37CM

## (undated) DEVICE — SUT VICRYL 0 27" UR-6

## (undated) DEVICE — TROCAR COVIDIEN VERSAPORT BLADELESS OPTICAL 5MM STANDARD

## (undated) DEVICE — TIP METZENBAUM SCISSOR MONOPOLAR ENDOCUT (ORANGE)

## (undated) DEVICE — PACK GENERAL LAPAROSCOPY

## (undated) DEVICE — TROCAR COVIDIEN VERSAONE FIXATION CANNULA 5MM

## (undated) DEVICE — TROCAR COVIDIEN VERSAPORT BLADELESS OPTICAL 12MM STANDARD

## (undated) DEVICE — ENDOCATCH 10MM SPECIMEN POUCH

## (undated) DEVICE — SOL IRR BAG NS 0.9% 3000ML

## (undated) DEVICE — D HELP - CLEARVIEW CLEARIFY SYSTEM